# Patient Record
Sex: FEMALE | URBAN - METROPOLITAN AREA
[De-identification: names, ages, dates, MRNs, and addresses within clinical notes are randomized per-mention and may not be internally consistent; named-entity substitution may affect disease eponyms.]

---

## 2022-09-09 ENCOUNTER — OFFICE VISIT (OUTPATIENT)
Facility: LOCATION | Age: 47
End: 2022-09-09
Payer: COMMERCIAL

## 2022-09-09 DIAGNOSIS — H52.03 HYPERMETROPIA, BILATERAL: Primary | ICD-10-CM

## 2022-09-09 PROCEDURE — 92004 COMPRE OPH EXAM NEW PT 1/>: CPT | Performed by: OPTOMETRIST

## 2022-09-09 ASSESSMENT — VISUAL ACUITY
OS: 20/25
OD: 20/20

## 2022-09-09 ASSESSMENT — INTRAOCULAR PRESSURE
OD: 13
OS: 14

## 2022-09-09 ASSESSMENT — KERATOMETRY
OD: 43.88
OS: 44.25

## 2022-09-09 NOTE — IMPRESSION/PLAN
Impression: Hypermetropia, bilateral: H52.03. Plan: Glasses prescription helps improve vision. Rx provided as requested.